# Patient Record
Sex: FEMALE | Race: WHITE | NOT HISPANIC OR LATINO
[De-identification: names, ages, dates, MRNs, and addresses within clinical notes are randomized per-mention and may not be internally consistent; named-entity substitution may affect disease eponyms.]

---

## 2017-03-07 ENCOUNTER — TRANSCRIPTION ENCOUNTER (OUTPATIENT)
Age: 47
End: 2017-03-07

## 2017-05-02 ENCOUNTER — APPOINTMENT (OUTPATIENT)
Dept: OBGYN | Facility: CLINIC | Age: 47
End: 2017-05-02

## 2017-08-01 ENCOUNTER — OTHER (OUTPATIENT)
Age: 47
End: 2017-08-01

## 2017-08-01 DIAGNOSIS — R92.8 OTHER ABNORMAL AND INCONCLUSIVE FINDINGS ON DIAGNOSTIC IMAGING OF BREAST: ICD-10-CM

## 2017-10-17 ENCOUNTER — APPOINTMENT (OUTPATIENT)
Dept: OBGYN | Facility: CLINIC | Age: 47
End: 2017-10-17
Payer: MEDICAID

## 2017-10-17 VITALS
WEIGHT: 210 LBS | DIASTOLIC BLOOD PRESSURE: 72 MMHG | BODY MASS INDEX: 37.21 KG/M2 | HEIGHT: 63 IN | SYSTOLIC BLOOD PRESSURE: 112 MMHG

## 2017-10-17 DIAGNOSIS — N39.0 URINARY TRACT INFECTION, SITE NOT SPECIFIED: ICD-10-CM

## 2017-10-17 DIAGNOSIS — Z86.39 PERSONAL HISTORY OF OTHER ENDOCRINE, NUTRITIONAL AND METABOLIC DISEASE: ICD-10-CM

## 2017-10-17 LAB
DATE COLLECTED: NORMAL
HEMOCCULT SP1 STL QL: NEGATIVE
QUALITY CONTROL: YES

## 2017-10-17 PROCEDURE — 82270 OCCULT BLOOD FECES: CPT

## 2017-10-17 PROCEDURE — 36415 COLL VENOUS BLD VENIPUNCTURE: CPT

## 2017-10-17 PROCEDURE — 99396 PREV VISIT EST AGE 40-64: CPT

## 2017-10-20 LAB
APPEARANCE: CLEAR
BACTERIA UR CULT: NORMAL
BACTERIA: ABNORMAL
BILIRUBIN URINE: NEGATIVE
BLOOD URINE: ABNORMAL
COLOR: YELLOW
GLUCOSE QUALITATIVE U: NEGATIVE MG/DL
HYALINE CASTS: 0 /LPF
KETONES URINE: NEGATIVE
LEUKOCYTE ESTERASE URINE: ABNORMAL
MICROSCOPIC-UA: NORMAL
NITRITE URINE: NEGATIVE
PH URINE: 5
PROLACTIN SERPL-MCNC: 48.7 NG/ML
PROTEIN URINE: NEGATIVE MG/DL
RED BLOOD CELLS URINE: 3 /HPF
SPECIFIC GRAVITY URINE: 1.02
SQUAMOUS EPITHELIAL CELLS: 11 /HPF
URINE COMMENTS: NORMAL
UROBILINOGEN URINE: NEGATIVE MG/DL
WHITE BLOOD CELLS URINE: 11 /HPF

## 2017-10-27 LAB
CYTOLOGY CVX/VAG DOC THIN PREP: NORMAL
HPV HIGH+LOW RISK DNA PNL CVX: NOT DETECTED

## 2018-10-22 ENCOUNTER — APPOINTMENT (OUTPATIENT)
Dept: OBGYN | Facility: CLINIC | Age: 48
End: 2018-10-22

## 2018-10-31 ENCOUNTER — OTHER (OUTPATIENT)
Age: 48
End: 2018-10-31

## 2018-11-14 ENCOUNTER — APPOINTMENT (OUTPATIENT)
Dept: SURGERY | Facility: CLINIC | Age: 48
End: 2018-11-14
Payer: MEDICAID

## 2018-11-14 VITALS
HEART RATE: 103 BPM | BODY MASS INDEX: 38.62 KG/M2 | OXYGEN SATURATION: 97 % | DIASTOLIC BLOOD PRESSURE: 86 MMHG | SYSTOLIC BLOOD PRESSURE: 137 MMHG | HEIGHT: 63 IN | WEIGHT: 218 LBS | TEMPERATURE: 98.4 F

## 2018-11-14 DIAGNOSIS — Z78.9 OTHER SPECIFIED HEALTH STATUS: ICD-10-CM

## 2018-11-14 DIAGNOSIS — Z82.0 FAMILY HISTORY OF EPILEPSY AND OTHER DISEASES OF THE NERVOUS SYSTEM: ICD-10-CM

## 2018-11-14 DIAGNOSIS — Z82.5 FAMILY HISTORY OF ASTHMA AND OTHER CHRONIC LOWER RESPIRATORY DISEASES: ICD-10-CM

## 2018-11-14 DIAGNOSIS — Z86.39 PERSONAL HISTORY OF OTHER ENDOCRINE, NUTRITIONAL AND METABOLIC DISEASE: ICD-10-CM

## 2018-11-14 DIAGNOSIS — R10.31 RIGHT LOWER QUADRANT PAIN: ICD-10-CM

## 2018-11-14 DIAGNOSIS — Z83.2 FAMILY HISTORY OF DISEASES OF THE BLOOD AND BLOOD-FORMING ORGANS AND CERTAIN DISORDERS INVOLVING THE IMMUNE MECHANISM: ICD-10-CM

## 2018-11-14 DIAGNOSIS — Z87.59 PERSONAL HISTORY OF OTHER COMPLICATIONS OF PREGNANCY, CHILDBIRTH AND THE PUERPERIUM: ICD-10-CM

## 2018-11-14 DIAGNOSIS — Z80.3 FAMILY HISTORY OF MALIGNANT NEOPLASM OF BREAST: ICD-10-CM

## 2018-11-14 DIAGNOSIS — Z82.49 FAMILY HISTORY OF ISCHEMIC HEART DISEASE AND OTHER DISEASES OF THE CIRCULATORY SYSTEM: ICD-10-CM

## 2018-11-14 DIAGNOSIS — Z83.49 FAMILY HISTORY OF OTHER ENDOCRINE, NUTRITIONAL AND METABOLIC DISEASES: ICD-10-CM

## 2018-11-14 DIAGNOSIS — Z82.61 FAMILY HISTORY OF ARTHRITIS: ICD-10-CM

## 2018-11-14 PROCEDURE — 99204 OFFICE O/P NEW MOD 45 MIN: CPT

## 2019-03-18 ENCOUNTER — OTHER (OUTPATIENT)
Age: 49
End: 2019-03-18

## 2020-07-09 ENCOUNTER — TRANSCRIPTION ENCOUNTER (OUTPATIENT)
Age: 50
End: 2020-07-09

## 2020-08-20 ENCOUNTER — APPOINTMENT (OUTPATIENT)
Dept: FAMILY MEDICINE | Facility: CLINIC | Age: 50
End: 2020-08-20

## 2020-10-22 PROBLEM — Z83.2 FAMILY HISTORY OF COAGULATION DISORDER: Status: ACTIVE | Noted: 2018-11-14

## 2020-12-15 PROBLEM — N39.0 ACUTE UTI: Status: RESOLVED | Noted: 2017-10-17 | Resolved: 2020-12-15

## 2021-02-17 ENCOUNTER — APPOINTMENT (OUTPATIENT)
Dept: FAMILY MEDICINE | Facility: CLINIC | Age: 51
End: 2021-02-17
Payer: MEDICAID

## 2021-02-17 ENCOUNTER — LABORATORY RESULT (OUTPATIENT)
Age: 51
End: 2021-02-17

## 2021-02-17 VITALS
RESPIRATION RATE: 16 BRPM | BODY MASS INDEX: 39.34 KG/M2 | OXYGEN SATURATION: 100 % | WEIGHT: 222 LBS | HEIGHT: 63 IN | TEMPERATURE: 98.7 F | HEART RATE: 80 BPM | SYSTOLIC BLOOD PRESSURE: 121 MMHG | DIASTOLIC BLOOD PRESSURE: 83 MMHG

## 2021-02-17 VITALS — TEMPERATURE: 97.6 F

## 2021-02-17 DIAGNOSIS — R53.83 OTHER FATIGUE: ICD-10-CM

## 2021-02-17 DIAGNOSIS — Z78.9 OTHER SPECIFIED HEALTH STATUS: ICD-10-CM

## 2021-02-17 DIAGNOSIS — Z82.62 FAMILY HISTORY OF OSTEOPOROSIS: ICD-10-CM

## 2021-02-17 PROCEDURE — 99204 OFFICE O/P NEW MOD 45 MIN: CPT | Mod: 25

## 2021-02-17 PROCEDURE — G0444 DEPRESSION SCREEN ANNUAL: CPT

## 2021-02-17 PROCEDURE — 99072 ADDL SUPL MATRL&STAF TM PHE: CPT

## 2021-02-17 RX ORDER — CHROMIUM 200 MCG
TABLET ORAL
Refills: 0 | Status: ACTIVE | COMMUNITY

## 2021-02-17 RX ORDER — COLD-HOT PACK
EACH MISCELLANEOUS
Refills: 0 | Status: ACTIVE | COMMUNITY

## 2021-02-17 NOTE — HEALTH RISK ASSESSMENT
[Yes] : Yes [Monthly or less (1 pt)] : Monthly or less (1 point) [1 or 2 (0 pts)] : 1 or 2 (0 points) [Never (0 pts)] : Never (0 points) [No] : In the past 12 months have you used drugs other than those required for medical reasons? No [No falls in past year] : Patient reported no falls in the past year [0] : 2) Feeling down, depressed, or hopeless: Not at all (0) [] : No [de-identified] : none  [de-identified] : GYN  [de-identified] : walking  [de-identified] : healthy  [[id number]] : 0

## 2021-02-17 NOTE — REVIEW OF SYSTEMS
[Negative] : Heme/Lymph [Fatigue] : fatigue [Skin Rash] : skin rash [de-identified] : left shoulder lump

## 2021-02-17 NOTE — COUNSELING
[Fall prevention counseling provided] : Fall prevention counseling provided [Adequate lighting] : Adequate lighting [No throw rugs] : No throw rugs [Use proper foot wear] : Use proper foot wear [Use recommended devices] : Use recommended devices [Potential consequences of obesity discussed] : Potential consequences of obesity discussed [Encouraged to increase physical activity] : Encouraged to increase physical activity [Encouraged to use exercise tracking device] : Encouraged to use exercise tracking device [Good understanding] : Patient has a good understanding of disease, goals and obesity follow-up plan

## 2021-02-17 NOTE — PHYSICAL EXAM
[Ill-Appearing] : ill-appearing [No Edema] : there was no peripheral edema [Normal] : affect was normal and insight and judgment were intact [de-identified] : skin lesion on the right side of the head. Left shoulder lump moveable

## 2021-02-17 NOTE — HISTORY OF PRESENT ILLNESS
[FreeTextEntry8] : CHET ORONA is a 50 year old female who presents to establish care \par LAst physical more than 3 years ago. \par She monitors a prolactin level she has not seen endocrinologist. \par She feels well today no complaints \par She has a lump on her left shoulder since march and one on the lefty side of the head. \par SHe has a hernia on the right groin, she was evaluated by surgeon

## 2021-02-17 NOTE — PAST MEDICAL HISTORY
[Definite ___ (Date)] : the last menstrual period was [unfilled] [Irregular Cycle Intervals] : are  irregular

## 2021-02-19 LAB
ALBUMIN SERPL ELPH-MCNC: 4.5 G/DL
ALP BLD-CCNC: 83 U/L
ALT SERPL-CCNC: 27 U/L
ANION GAP SERPL CALC-SCNC: 13 MMOL/L
APPEARANCE: CLEAR
AST SERPL-CCNC: 19 U/L
BACTERIA: NEGATIVE
BASOPHILS # BLD AUTO: 0.05 K/UL
BASOPHILS NFR BLD AUTO: 0.6 %
BILIRUB SERPL-MCNC: 0.3 MG/DL
BILIRUBIN URINE: NEGATIVE
BLOOD URINE: NORMAL
BUN SERPL-MCNC: 21 MG/DL
CALCIUM SERPL-MCNC: 9.9 MG/DL
CHLORIDE SERPL-SCNC: 101 MMOL/L
CHOLEST SERPL-MCNC: 243 MG/DL
CO2 SERPL-SCNC: 23 MMOL/L
COLOR: NORMAL
CREAT SERPL-MCNC: 0.82 MG/DL
EOSINOPHIL # BLD AUTO: 0.19 K/UL
EOSINOPHIL NFR BLD AUTO: 2.3 %
ESTIMATED AVERAGE GLUCOSE: 114 MG/DL
GLUCOSE QUALITATIVE U: NEGATIVE
GLUCOSE SERPL-MCNC: 110 MG/DL
HBA1C MFR BLD HPLC: 5.6 %
HCT VFR BLD CALC: 41.1 %
HDLC SERPL-MCNC: 62 MG/DL
HGB BLD-MCNC: 13 G/DL
HYALINE CASTS: 2 /LPF
IMM GRANULOCYTES NFR BLD AUTO: 0.4 %
IRON SERPL-MCNC: 62 UG/DL
KETONES URINE: NEGATIVE
LDLC SERPL CALC-MCNC: 160 MG/DL
LEUKOCYTE ESTERASE URINE: ABNORMAL
LYMPHOCYTES # BLD AUTO: 2.65 K/UL
LYMPHOCYTES NFR BLD AUTO: 32.7 %
MAGNESIUM SERPL-MCNC: 2.1 MG/DL
MAN DIFF?: NORMAL
MCHC RBC-ENTMCNC: 30.1 PG
MCHC RBC-ENTMCNC: 31.6 GM/DL
MCV RBC AUTO: 95.1 FL
MICROSCOPIC-UA: NORMAL
MONOCYTES # BLD AUTO: 0.59 K/UL
MONOCYTES NFR BLD AUTO: 7.3 %
NEUTROPHILS # BLD AUTO: 4.6 K/UL
NEUTROPHILS NFR BLD AUTO: 56.7 %
NITRITE URINE: NEGATIVE
NONHDLC SERPL-MCNC: 181 MG/DL
PH URINE: 5.5
PLATELET # BLD AUTO: 367 K/UL
POTASSIUM SERPL-SCNC: 4.6 MMOL/L
PROT SERPL-MCNC: 7.4 G/DL
PROTEIN URINE: NEGATIVE
RBC # BLD: 4.32 M/UL
RBC # FLD: 14.3 %
RED BLOOD CELLS URINE: 1 /HPF
SODIUM SERPL-SCNC: 137 MMOL/L
SPECIFIC GRAVITY URINE: 1.01
SQUAMOUS EPITHELIAL CELLS: 3 /HPF
TRIGL SERPL-MCNC: 106 MG/DL
UROBILINOGEN URINE: NORMAL
WBC # FLD AUTO: 8.11 K/UL
WHITE BLOOD CELLS URINE: 7 /HPF

## 2021-02-23 ENCOUNTER — APPOINTMENT (OUTPATIENT)
Dept: FAMILY MEDICINE | Facility: CLINIC | Age: 51
End: 2021-02-23
Payer: MEDICAID

## 2021-02-23 DIAGNOSIS — R31.29 OTHER MICROSCOPIC HEMATURIA: ICD-10-CM

## 2021-02-23 LAB
25(OH)D3 SERPL-MCNC: 26.1 NG/ML
FERRITIN SERPL-MCNC: 23 NG/ML
FOLATE SERPL-MCNC: 9.8 NG/ML
PROLACTIN SERPL-MCNC: 34.8 NG/ML
T3 SERPL-MCNC: 117 NG/DL
T3FREE SERPL-MCNC: 3 PG/ML
T3RU NFR SERPL: 1.1 TBI
T4 FREE SERPL-MCNC: 1.1 NG/DL
TSH SERPL-ACNC: 2.63 UIU/ML
VIT B12 SERPL-MCNC: 426 PG/ML

## 2021-02-23 PROCEDURE — 99442: CPT

## 2021-02-23 NOTE — HEALTH RISK ASSESSMENT
[] : No [No] : In the past 12 months have you used drugs other than those required for medical reasons? No [de-identified] : none  [de-identified] : none  [de-identified] : n

## 2021-02-23 NOTE — HISTORY OF PRESENT ILLNESS
[Home] : at home, [unfilled] , at the time of the visit. [Medical Office: ([facility])___] : at the medical office located in  [Verbal consent obtained from patient] : the patient, [unfilled] [FreeTextEntry1] : Follow up  [de-identified] : CHET OROAN is a 50 year old female who presents for follow up. \par We discussed all her results at length including her cholesterol levels. She reports she has been doing a low fat diet and started an exercise routine, we discussed this at length. We agreed to continue the diet and exercise and we will reapt her lipid levels in 3 months from today. \par We also discussed her urine, possible contaminated. She reports she was told her urine had blood before, no follow ups were done. Will send for cytology. \par We discussed her prolactin levels, she will schedule a visit with endocrinologist to do further testing. \par She will schedule a physical follow up

## 2021-02-23 NOTE — PHYSICAL EXAM
[Normal] : affect was normal and insight and judgment were intact [de-identified] : limited due to telephone guru

## 2021-03-11 ENCOUNTER — APPOINTMENT (OUTPATIENT)
Dept: FAMILY MEDICINE | Facility: CLINIC | Age: 51
End: 2021-03-11

## 2021-11-03 ENCOUNTER — APPOINTMENT (OUTPATIENT)
Dept: OBGYN | Facility: CLINIC | Age: 51
End: 2021-11-03

## 2022-03-21 VITALS — HEIGHT: 63 IN | BODY MASS INDEX: 33.66 KG/M2 | WEIGHT: 190 LBS

## 2022-04-06 ENCOUNTER — APPOINTMENT (OUTPATIENT)
Dept: FAMILY MEDICINE | Facility: CLINIC | Age: 52
End: 2022-04-06
Payer: MEDICAID

## 2022-04-06 ENCOUNTER — RESULT REVIEW (OUTPATIENT)
Age: 52
End: 2022-04-06

## 2022-04-06 VITALS
BODY MASS INDEX: 33.66 KG/M2 | OXYGEN SATURATION: 100 % | HEART RATE: 96 BPM | WEIGHT: 190 LBS | HEIGHT: 63 IN | SYSTOLIC BLOOD PRESSURE: 130 MMHG | DIASTOLIC BLOOD PRESSURE: 85 MMHG | TEMPERATURE: 97.9 F

## 2022-04-06 DIAGNOSIS — K43.9 VENTRAL HERNIA W/OUT OBSTRUCTION OR GANGRENE: ICD-10-CM

## 2022-04-06 PROCEDURE — 99214 OFFICE O/P EST MOD 30 MIN: CPT

## 2022-04-07 ENCOUNTER — APPOINTMENT (OUTPATIENT)
Dept: CT IMAGING | Facility: CLINIC | Age: 52
End: 2022-04-07
Payer: MEDICAID

## 2022-04-07 ENCOUNTER — NON-APPOINTMENT (OUTPATIENT)
Age: 52
End: 2022-04-07

## 2022-04-07 PROCEDURE — 74176 CT ABD & PELVIS W/O CONTRAST: CPT

## 2022-04-08 ENCOUNTER — NON-APPOINTMENT (OUTPATIENT)
Age: 52
End: 2022-04-08

## 2022-04-11 ENCOUNTER — APPOINTMENT (OUTPATIENT)
Dept: SURGERY | Facility: CLINIC | Age: 52
End: 2022-04-11
Payer: MEDICAID

## 2022-04-11 VITALS
BODY MASS INDEX: 37.56 KG/M2 | WEIGHT: 212 LBS | OXYGEN SATURATION: 97 % | SYSTOLIC BLOOD PRESSURE: 141 MMHG | TEMPERATURE: 97.6 F | DIASTOLIC BLOOD PRESSURE: 84 MMHG | HEIGHT: 63 IN | HEART RATE: 86 BPM

## 2022-04-11 PROCEDURE — 99214 OFFICE O/P EST MOD 30 MIN: CPT

## 2022-04-11 NOTE — DATA REVIEWED
[FreeTextEntry1] : Independent review of the abd/pel CT scan reveals a fat containing incisional hernia of the lower abdominal wall, there is no bowel involvement, the defect is 4.4 cm in overall transverse diameter and multiloculated in the superior-inferior plane

## 2022-04-11 NOTE — ASSESSMENT
[FreeTextEntry1] : The patient is an obese 51 year old with a stable incisional hernia.  The patient has been advised that weight loss will be an important adjunct to help potentially reduce the risks of infection, hernia recurrence, and chronic post operative pain associated with surgery by potentially reducing the tension along the abdominal wall.   The risks, benefits, and alternatives including the option of doing nothing to a robotic, possible laparoscopic, and possible open incisional hernia repair with  mesh were discussed.  The potential complications including but not limited to infection, bleeding, hernia recurrence, chronic post-operative pain, and seroma formation were discussed.  The patient was educated regarding the signs and symptoms of hernia strangulation and advised to seek immediate MD evaluation should this occur.  The patient understands and wishes to proceed conservatively at this time.  She will start to wear an abdominal binder and embark on a weight loss routine.  She will follow up in 3 months or sooner should any problems or issues arise.  A total of 30 minutes was spent coordinating the patient's care. \par

## 2022-04-11 NOTE — PHYSICAL EXAM
[JVD] : no jugular venous distention  [No Rash or Lesion] : No rash or lesion [Purpura] : no purpura  [Petechiae] : no petechiae [Skin Ulcer] : no ulcer [Skin Induration] : no induration [Alert] : alert [Oriented to Person] : oriented to person [Oriented to Place] : oriented to place [Oriented to Time] : oriented to time [Calm] : calm [de-identified] : non toxic, in no acute distress [de-identified] : NC/AT PERRL EOMI no scleral icterus [de-identified] : trachea midline, no gross mass, no lymphadenopathy [de-identified] : no audible wheezing or stridor [de-identified] : obese, soft, no localizing tenderness, no guarding, no rebound, no masses, there is a fullness appreciated in the right lower abdominal wall area adjacent to the lower midline scar [de-identified] : FROM of all extremities with no gross deformity or angulation, there remains an incisional hernia appreciated in the right lower abdominal wall area, the fascial edges are obscured secondary to the patient's body habitus [de-identified] : surgical scars consistent with the patient's prior surgical history [de-identified] : mood is calm

## 2022-04-11 NOTE — HISTORY OF PRESENT ILLNESS
[de-identified] : The patient returns to the office with less pain in the area of the hernia following a large bowel movement after having her CT scan contrast.  She reports that at present she has no abdominal pain, nausea, or vomit.  The patient is without nausea or vomit.  She has no fevers or chills.  She reports that the hernia does not feel any larger or different.

## 2022-04-15 ENCOUNTER — APPOINTMENT (OUTPATIENT)
Dept: FAMILY MEDICINE | Facility: CLINIC | Age: 52
End: 2022-04-15

## 2022-05-10 DIAGNOSIS — Z12.39 ENCOUNTER FOR OTHER SCREENING FOR MALIGNANT NEOPLASM OF BREAST: ICD-10-CM

## 2022-07-06 ENCOUNTER — APPOINTMENT (OUTPATIENT)
Dept: FAMILY MEDICINE | Facility: CLINIC | Age: 52
End: 2022-07-06

## 2022-07-11 ENCOUNTER — APPOINTMENT (OUTPATIENT)
Dept: SURGERY | Facility: CLINIC | Age: 52
End: 2022-07-11

## 2022-08-29 ENCOUNTER — APPOINTMENT (OUTPATIENT)
Dept: COLORECTAL SURGERY | Facility: CLINIC | Age: 52
End: 2022-08-29

## 2022-09-16 ENCOUNTER — APPOINTMENT (OUTPATIENT)
Dept: AFTER HOURS CARE | Facility: EMERGENCY ROOM | Age: 52
End: 2022-09-16

## 2022-09-16 DIAGNOSIS — J98.01 ACUTE BRONCHOSPASM: ICD-10-CM

## 2022-09-16 DIAGNOSIS — J06.9 ACUTE UPPER RESPIRATORY INFECTION, UNSPECIFIED: ICD-10-CM

## 2022-09-16 PROCEDURE — 99204 OFFICE O/P NEW MOD 45 MIN: CPT | Mod: 95

## 2022-09-16 RX ORDER — ALBUTEROL SULFATE 0.63 MG/3ML
0.63 SOLUTION RESPIRATORY (INHALATION) 4 TIMES DAILY
Qty: 1 | Refills: 0 | Status: ACTIVE | COMMUNITY
Start: 2022-09-16 | End: 1900-01-01

## 2022-09-16 NOTE — HISTORY OF PRESENT ILLNESS
[Home] : at home, [unfilled] , at the time of the visit. [Other Location: e.g. Home (Enter Location, City,State)___] : at [unfilled] [Verbal consent obtained from patient] : the patient, [unfilled] [FreeTextEntry8] : Sore throat x 4 days now worsening now a/w worsening cough with sputum + wheeze.  States this is c/w her prior e/o of bronchitis.  +chills but no fever.  No sick contacts.  No n/v/d. No throat tightness or rash.  Able to ambulate without SOB or dyspnea.  No CP.  No travel or sick contacts.  COVID negative x 3 tests\par \par Allergy -- levoquin/cipro\par \par PMH HTN HLD\par PSH partial colectomy

## 2022-09-16 NOTE — PLAN
[FreeTextEntry1] : Albuterol MDI and neb solution q4 prn wheeze\par Prednisone 20mg BID x 4 days\par 24 hour watchful waiting azithromycin.  Pt will start if symptoms do not improve or if she develops a fever\par Strict ED precautions

## 2022-09-16 NOTE — ASSESSMENT
[FreeTextEntry1] : URI with reactive airway component.  No audible wheeze and no dypsnea on exam.  Negative COVID tests x 3.  Possible PNA given chills.  \par \par Note -- patient states her reaction to azithromycin in the past was diarrhea without rash/vomiting.  Specifies that she did not develop not airway symptoms with azithromycin.

## 2022-09-19 ENCOUNTER — NON-APPOINTMENT (OUTPATIENT)
Age: 52
End: 2022-09-19

## 2022-09-27 ENCOUNTER — APPOINTMENT (OUTPATIENT)
Dept: FAMILY MEDICINE | Facility: CLINIC | Age: 52
End: 2022-09-27

## 2022-09-27 ENCOUNTER — NON-APPOINTMENT (OUTPATIENT)
Age: 52
End: 2022-09-27

## 2022-09-27 VITALS
HEIGHT: 63 IN | OXYGEN SATURATION: 100 % | TEMPERATURE: 98.1 F | WEIGHT: 212 LBS | DIASTOLIC BLOOD PRESSURE: 89 MMHG | BODY MASS INDEX: 37.56 KG/M2 | HEART RATE: 85 BPM | SYSTOLIC BLOOD PRESSURE: 131 MMHG

## 2022-09-27 DIAGNOSIS — K43.2 INCISIONAL HERNIA W/OUT OBSTRUCTION OR GANGRENE: ICD-10-CM

## 2022-09-27 DIAGNOSIS — Z00.00 ENCOUNTER FOR GENERAL ADULT MEDICAL EXAMINATION W/OUT ABNORMAL FINDINGS: ICD-10-CM

## 2022-09-27 DIAGNOSIS — F32.A DEPRESSION, UNSPECIFIED: ICD-10-CM

## 2022-09-27 PROCEDURE — 96160 PT-FOCUSED HLTH RISK ASSMT: CPT | Mod: 59

## 2022-09-27 PROCEDURE — G0444 DEPRESSION SCREEN ANNUAL: CPT | Mod: 59

## 2022-09-27 PROCEDURE — 99396 PREV VISIT EST AGE 40-64: CPT | Mod: 25

## 2022-09-27 PROCEDURE — 93000 ELECTROCARDIOGRAM COMPLETE: CPT | Mod: 59

## 2022-09-27 PROCEDURE — 92552 PURE TONE AUDIOMETRY AIR: CPT

## 2022-11-07 ENCOUNTER — LABORATORY RESULT (OUTPATIENT)
Age: 52
End: 2022-11-07

## 2022-11-08 LAB
25(OH)D3 SERPL-MCNC: 30 NG/ML
ALBUMIN SERPL ELPH-MCNC: 4.5 G/DL
ALP BLD-CCNC: 85 U/L
ALT SERPL-CCNC: 25 U/L
ANION GAP SERPL CALC-SCNC: 9 MMOL/L
APPEARANCE: CLEAR
AST SERPL-CCNC: 22 U/L
BASOPHILS # BLD AUTO: 0.04 K/UL
BASOPHILS NFR BLD AUTO: 0.5 %
BILIRUB SERPL-MCNC: 0.3 MG/DL
BILIRUBIN URINE: NEGATIVE
BLOOD URINE: NEGATIVE
BUN SERPL-MCNC: 13 MG/DL
CALCIUM SERPL-MCNC: 10.4 MG/DL
CHLORIDE SERPL-SCNC: 104 MMOL/L
CHOLEST SERPL-MCNC: 235 MG/DL
CO2 SERPL-SCNC: 27 MMOL/L
COLOR: COLORLESS
COVID-19 NUCLEOCAPSID  GAM ANTIBODY INTERPRETATION: NEGATIVE
COVID-19 SPIKE DOMAIN ANTIBODY INTERPRETATION: POSITIVE
CREAT SERPL-MCNC: 0.69 MG/DL
EGFR: 105 ML/MIN/1.73M2
EOSINOPHIL # BLD AUTO: 0.13 K/UL
EOSINOPHIL NFR BLD AUTO: 1.7 %
ESTIMATED AVERAGE GLUCOSE: 111 MG/DL
FERRITIN SERPL-MCNC: 25 NG/ML
FOLATE SERPL-MCNC: 19.5 NG/ML
GLUCOSE QUALITATIVE U: NEGATIVE
GLUCOSE SERPL-MCNC: 106 MG/DL
HBA1C MFR BLD HPLC: 5.5 %
HCT VFR BLD CALC: 44.3 %
HDLC SERPL-MCNC: 59 MG/DL
HGB BLD-MCNC: 13.8 G/DL
IMM GRANULOCYTES NFR BLD AUTO: 0.4 %
KETONES URINE: NEGATIVE
LDLC SERPL CALC-MCNC: 152 MG/DL
LEUKOCYTE ESTERASE URINE: ABNORMAL
LYMPHOCYTES # BLD AUTO: 2.47 K/UL
LYMPHOCYTES NFR BLD AUTO: 32.3 %
MAN DIFF?: NORMAL
MCHC RBC-ENTMCNC: 31.1 PG
MCHC RBC-ENTMCNC: 31.2 GM/DL
MCV RBC AUTO: 99.8 FL
MONOCYTES # BLD AUTO: 0.51 K/UL
MONOCYTES NFR BLD AUTO: 6.7 %
NEUTROPHILS # BLD AUTO: 4.47 K/UL
NEUTROPHILS NFR BLD AUTO: 58.4 %
NITRITE URINE: NEGATIVE
NONHDLC SERPL-MCNC: 176 MG/DL
PH URINE: 6.5
PLATELET # BLD AUTO: 357 K/UL
POTASSIUM SERPL-SCNC: 5.1 MMOL/L
PROLACTIN SERPL-MCNC: 54 NG/ML
PROT SERPL-MCNC: 7.6 G/DL
PROTEIN URINE: NEGATIVE
RBC # BLD: 4.44 M/UL
RBC # FLD: 14.9 %
SARS-COV-2 AB SERPL IA-ACNC: 237 U/ML
SARS-COV-2 AB SERPL QL IA: 0.07 INDEX
SODIUM SERPL-SCNC: 140 MMOL/L
SPECIFIC GRAVITY URINE: 1.01
TRIGL SERPL-MCNC: 118 MG/DL
TSH SERPL-ACNC: 2.07 UIU/ML
TTG IGA SER IA-ACNC: <1.2 U/ML
TTG IGA SER-ACNC: NEGATIVE
TTG IGG SER IA-ACNC: <1.2 U/ML
TTG IGG SER IA-ACNC: NEGATIVE
UROBILINOGEN URINE: NORMAL
VIT B12 SERPL-MCNC: 547 PG/ML
WBC # FLD AUTO: 7.65 K/UL

## 2022-11-09 LAB
CLAM IGE QN: <0.1 KUA/L
CODFISH IGE QN: <0.1 KUA/L
CORN IGE QN: <0.1 KUA/L
COW MILK IGE QN: <0.1 KUA/L
DEPRECATED CLAM IGE RAST QL: 0
DEPRECATED CODFISH IGE RAST QL: 0
DEPRECATED CORN IGE RAST QL: 0
DEPRECATED COW MILK IGE RAST QL: 0
DEPRECATED EGG WHITE IGE RAST QL: 0
DEPRECATED GLUTEN IGE RAST QL: <0.1 KUA/L
DEPRECATED PEANUT IGE RAST QL: 0
DEPRECATED SCALLOP IGE RAST QL: 0.11 KUA/L
DEPRECATED SESAME SEED IGE RAST QL: 0
DEPRECATED SHRIMP IGE RAST QL: 0
DEPRECATED SOYBEAN IGE RAST QL: 0
DEPRECATED WALNUT IGE RAST QL: 0
DEPRECATED WHEAT IGE RAST QL: 0
EGG WHITE IGE QN: <0.1 KUA/L
GLUTEN IGG QN: 0
PEANUT IGE QN: <0.1 KUA/L
SCALLOP IGE QN: <0.1 KUA/L
SCALLOP IGE QN: NORMAL
SESAME SEED IGE QN: <0.1 KUA/L
SOYBEAN IGE QN: <0.1 KUA/L
TOTAL IGE SMQN RAST: 17 KU/L
WALNUT IGE QN: <0.1 KUA/L
WHEAT IGE QN: <0.1 KUA/L

## 2023-01-03 ENCOUNTER — NON-APPOINTMENT (OUTPATIENT)
Age: 53
End: 2023-01-03

## 2023-01-04 ENCOUNTER — APPOINTMENT (OUTPATIENT)
Dept: FAMILY MEDICINE | Facility: CLINIC | Age: 53
End: 2023-01-04

## 2023-02-10 ENCOUNTER — APPOINTMENT (OUTPATIENT)
Dept: OBGYN | Facility: CLINIC | Age: 53
End: 2023-02-10

## 2023-02-15 ENCOUNTER — APPOINTMENT (OUTPATIENT)
Dept: OBGYN | Facility: CLINIC | Age: 53
End: 2023-02-15
Payer: MEDICAID

## 2023-02-15 ENCOUNTER — NON-APPOINTMENT (OUTPATIENT)
Age: 53
End: 2023-02-15

## 2023-02-15 VITALS
SYSTOLIC BLOOD PRESSURE: 122 MMHG | DIASTOLIC BLOOD PRESSURE: 80 MMHG | BODY MASS INDEX: 38.27 KG/M2 | WEIGHT: 216 LBS | HEIGHT: 63 IN

## 2023-02-15 DIAGNOSIS — R92.2 INCONCLUSIVE MAMMOGRAM: ICD-10-CM

## 2023-02-15 DIAGNOSIS — Z01.419 ENCOUNTER FOR GYNECOLOGICAL EXAMINATION (GENERAL) (ROUTINE) W/OUT ABNORMAL FINDINGS: ICD-10-CM

## 2023-02-15 DIAGNOSIS — Z12.31 ENCOUNTER FOR SCREENING MAMMOGRAM FOR MALIGNANT NEOPLASM OF BREAST: ICD-10-CM

## 2023-02-15 DIAGNOSIS — Z12.4 ENCOUNTER FOR SCREENING FOR MALIGNANT NEOPLASM OF CERVIX: ICD-10-CM

## 2023-02-15 PROCEDURE — 99386 PREV VISIT NEW AGE 40-64: CPT

## 2023-02-15 RX ORDER — PREDNISONE 20 MG/1
20 TABLET ORAL DAILY
Qty: 8 | Refills: 0 | Status: DISCONTINUED | COMMUNITY
Start: 2022-09-16 | End: 2023-02-15

## 2023-02-15 RX ORDER — AZITHROMYCIN 250 MG/1
250 TABLET, FILM COATED ORAL
Qty: 1 | Refills: 0 | Status: DISCONTINUED | COMMUNITY
Start: 2022-09-16 | End: 2023-02-15

## 2023-02-15 RX ORDER — ALBUTEROL SULFATE 90 UG/1
108 (90 BASE) INHALANT RESPIRATORY (INHALATION)
Qty: 1 | Refills: 0 | Status: DISCONTINUED | COMMUNITY
Start: 2022-09-16 | End: 2023-02-15

## 2023-02-15 NOTE — HISTORY OF PRESENT ILLNESS
[N] : Patient is not sexually active [Y] : Positive pregnancy history [Menarche Age: ____] : age at menarche was [unfilled] [Previously active] : previously active [No] : No [Hot Flashes] : hot flashes [Night Sweats] : night sweats [Mammogramdate] : 05/27/22 [TextBox_19] : BR 2  [BreastSonogramDate] : 05/27/22 [TextBox_25] : BR 2  [PapSmeardate] : 10/17/17 [TextBox_31] : NEG  [ColonoscopyDate] : 2013 [LMPDate] : 11/2022 [PGHxTotal] : 3 [[facility]] : 2 [[facility] Center[facility]Living] : 2 [PGxEctopic] : 1 [FreeTextEntry1] : 11/2022

## 2023-02-15 NOTE — PHYSICAL EXAM
[Chaperone Present] : A chaperone was present in the examining room during all aspects of the physical examination [FreeTextEntry1] : roberto [Appropriately responsive] : appropriately responsive [Alert] : alert [No Acute Distress] : no acute distress [No Lymphadenopathy] : no lymphadenopathy [Soft] : soft [Non-tender] : non-tender [Non-distended] : non-distended [Oriented x3] : oriented x3 [Examination Of The Breasts] : a normal appearance [No Discharge] : no discharge [No Masses] : no breast masses were palpable [Labia Majora] : normal [Labia Minora] : normal [No Bleeding] : There was no active vaginal bleeding [Normal] : normal [Uterine Adnexae] : non-palpable

## 2023-02-20 LAB
CYTOLOGY CVX/VAG DOC THIN PREP: NORMAL
HPV HIGH+LOW RISK DNA PNL CVX: NOT DETECTED

## 2023-07-19 ENCOUNTER — APPOINTMENT (OUTPATIENT)
Dept: FAMILY MEDICINE | Facility: CLINIC | Age: 53
End: 2023-07-19
Payer: MEDICAID

## 2023-07-19 VITALS
HEIGHT: 63 IN | HEART RATE: 90 BPM | DIASTOLIC BLOOD PRESSURE: 86 MMHG | SYSTOLIC BLOOD PRESSURE: 134 MMHG | BODY MASS INDEX: 38.27 KG/M2 | OXYGEN SATURATION: 98 % | TEMPERATURE: 98.4 F | WEIGHT: 216 LBS

## 2023-07-19 DIAGNOSIS — R63.5 ABNORMAL WEIGHT GAIN: ICD-10-CM

## 2023-07-19 DIAGNOSIS — E66.9 OBESITY, UNSPECIFIED: ICD-10-CM

## 2023-07-19 DIAGNOSIS — M50.90 CERVICAL DISC DISORDER, UNSPECIFIED, UNSPECIFIED CERVICAL REGION: ICD-10-CM

## 2023-07-19 PROCEDURE — 99214 OFFICE O/P EST MOD 30 MIN: CPT

## 2023-07-21 LAB
ALBUMIN SERPL ELPH-MCNC: 4.3 G/DL
ALP BLD-CCNC: 85 U/L
ALT SERPL-CCNC: 22 U/L
ANION GAP SERPL CALC-SCNC: 13 MMOL/L
AST SERPL-CCNC: 18 U/L
BILIRUB SERPL-MCNC: 0.5 MG/DL
BUN SERPL-MCNC: 18 MG/DL
CALCIUM SERPL-MCNC: 9.7 MG/DL
CHLORIDE SERPL-SCNC: 103 MMOL/L
CO2 SERPL-SCNC: 24 MMOL/L
CREAT SERPL-MCNC: 0.75 MG/DL
EGFR: 96 ML/MIN/1.73M2
ESTIMATED AVERAGE GLUCOSE: 120 MG/DL
FERRITIN SERPL-MCNC: 42 NG/ML
FOLATE SERPL-MCNC: >20 NG/ML
GLUCOSE SERPL-MCNC: 100 MG/DL
HBA1C MFR BLD HPLC: 5.8 %
POTASSIUM SERPL-SCNC: 4.9 MMOL/L
PROLACTIN SERPL-MCNC: 22.8 NG/ML
PROT SERPL-MCNC: 6.9 G/DL
SODIUM SERPL-SCNC: 139 MMOL/L
T3 SERPL-MCNC: 111 NG/DL
T3FREE SERPL-MCNC: 2.69 PG/ML
T4 SERPL-MCNC: 6.6 UG/DL
TSH SERPL-ACNC: 2.88 UIU/ML
VIT B12 SERPL-MCNC: 434 PG/ML

## 2023-08-01 ENCOUNTER — APPOINTMENT (OUTPATIENT)
Dept: MRI IMAGING | Facility: CLINIC | Age: 53
End: 2023-08-01

## 2023-08-28 ENCOUNTER — APPOINTMENT (OUTPATIENT)
Dept: FAMILY MEDICINE | Facility: CLINIC | Age: 53
End: 2023-08-28
Payer: MEDICAID

## 2023-08-28 VITALS
TEMPERATURE: 98 F | HEIGHT: 63 IN | WEIGHT: 216 LBS | SYSTOLIC BLOOD PRESSURE: 121 MMHG | RESPIRATION RATE: 18 BRPM | HEART RATE: 83 BPM | BODY MASS INDEX: 38.27 KG/M2 | OXYGEN SATURATION: 100 % | DIASTOLIC BLOOD PRESSURE: 78 MMHG

## 2023-08-28 DIAGNOSIS — D35.2 BENIGN NEOPLASM OF PITUITARY GLAND: ICD-10-CM

## 2023-08-28 DIAGNOSIS — E04.1 NONTOXIC SINGLE THYROID NODULE: ICD-10-CM

## 2023-08-28 DIAGNOSIS — M79.676 PAIN IN UNSPECIFIED TOE(S): ICD-10-CM

## 2023-08-28 DIAGNOSIS — E78.00 PURE HYPERCHOLESTEROLEMIA, UNSPECIFIED: ICD-10-CM

## 2023-08-28 PROCEDURE — 99214 OFFICE O/P EST MOD 30 MIN: CPT

## 2023-08-28 RX ORDER — GABAPENTIN 100 MG/1
100 CAPSULE ORAL
Qty: 180 | Refills: 0 | Status: ACTIVE | COMMUNITY
Start: 2023-08-28 | End: 1900-01-01
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
## 2023-08-28 NOTE — ASSESSMENT
[FreeTextEntry1] : Burning pain in the L toe  -following neurology and getting NCV studies   Thyroid nodule  -US thyroid:2 thyroid nodules in L thyroid nodule 3 x3x 3 mm and 7x6x6 mm nodules and still repeat in 07/24     Hernia /Gall stones  -following surgery    Family h/o of breast cancer  -advised to follow GYN    Elevated prolactin  -Family h/o of microadenoma and personal h/o of pituitary macroadenoma she doesn't want contrast ordered  MRI of the brain  -repeat didnt have it done and doesnt want to follow ENDO    HLD  -low fat diet  -Life style modification discussed    Obesity  -Loose weight and exercise  -Watch diet  -Lifestyle modifications  -Mediterranean diet  -5 A approach for Counselling  RTO for follow up after seeing neurology

## 2023-09-08 ENCOUNTER — APPOINTMENT (OUTPATIENT)
Dept: FAMILY MEDICINE | Facility: CLINIC | Age: 53
End: 2023-09-08

## 2023-09-27 ENCOUNTER — APPOINTMENT (OUTPATIENT)
Dept: OPHTHALMOLOGY | Facility: CLINIC | Age: 53
End: 2023-09-27

## 2023-10-19 ENCOUNTER — APPOINTMENT (OUTPATIENT)
Dept: MRI IMAGING | Facility: CLINIC | Age: 53
End: 2023-10-19

## 2023-11-01 ENCOUNTER — APPOINTMENT (OUTPATIENT)
Dept: FAMILY MEDICINE | Facility: CLINIC | Age: 53
End: 2023-11-01

## 2024-07-05 DIAGNOSIS — Z12.39 ENCOUNTER FOR OTHER SCREENING FOR MALIGNANT NEOPLASM OF BREAST: ICD-10-CM

## 2024-09-09 ENCOUNTER — NON-APPOINTMENT (OUTPATIENT)
Age: 54
End: 2024-09-09

## 2024-09-10 ENCOUNTER — NON-APPOINTMENT (OUTPATIENT)
Age: 54
End: 2024-09-10

## 2025-04-10 ENCOUNTER — APPOINTMENT (OUTPATIENT)
Dept: OTOLARYNGOLOGY | Facility: CLINIC | Age: 55
End: 2025-04-10